# Patient Record
Sex: FEMALE | Race: WHITE | URBAN - METROPOLITAN AREA
[De-identification: names, ages, dates, MRNs, and addresses within clinical notes are randomized per-mention and may not be internally consistent; named-entity substitution may affect disease eponyms.]

---

## 2022-03-18 PROBLEM — R22.1 LUMP IN NECK: Status: ACTIVE | Noted: 2022-02-14

## 2022-03-19 PROBLEM — E04.1 THYROID NODULE: Status: ACTIVE | Noted: 2022-02-14

## 2023-02-21 ENCOUNTER — OFFICE VISIT (OUTPATIENT)
Dept: ENDOCRINOLOGY | Age: 48
End: 2023-02-21
Payer: COMMERCIAL

## 2023-02-21 VITALS — SYSTOLIC BLOOD PRESSURE: 102 MMHG | OXYGEN SATURATION: 99 % | DIASTOLIC BLOOD PRESSURE: 70 MMHG | HEART RATE: 73 BPM

## 2023-02-21 DIAGNOSIS — E04.1 THYROID NODULE: Primary | ICD-10-CM

## 2023-02-21 DIAGNOSIS — R22.1 LUMP IN NECK: ICD-10-CM

## 2023-02-21 PROCEDURE — 99214 OFFICE O/P EST MOD 30 MIN: CPT | Performed by: INTERNAL MEDICINE

## 2023-02-21 PROCEDURE — 76536 US EXAM OF HEAD AND NECK: CPT | Performed by: INTERNAL MEDICINE

## 2023-02-21 ASSESSMENT — ENCOUNTER SYMPTOMS
DIARRHEA: 0
CONSTIPATION: 1

## 2023-02-21 NOTE — PROGRESS NOTES
Jem Dee MD, [facility]  [address], [address] [facility], [address] [place] [address] Nikh[place]  Phone [phone]          Viet Holliday is a 50 y.o. female seen 2/21/2023 for follow-up of thyroid nodule      ASSESSMENT AND PLAN:    1. Thyroid nodule  Status post negative FNA biopsy of the inferior left lobe nodule 3/2021. Thyroid ultrasound performed today revealed that the nodule was stable in size and without suspicious sonographic features. I will have her return in 2 years for a follow-up ultrasound to document stability. 2. Lump in neck  She has been told that the right submandibular lump likely represents a lymph node or lipoma. She reports that the lump frequently enlarges and then gets smaller in size, which is not typical of a lipoma. I did not see any obvious mass on ultrasound today. Dr. Adithya Gill ordered a neck CT but she never had it performed. I recommended that she follow-up with Dr. Adithya Gill. She may ultimately benefit from just having the lump resected. Procedures:    Thyroid ultrasound 2/21/2023: Right lobe 1.64 x 1.09 x 4.40 cm, homogeneous echotexture. In the mid to inferior right lobe anteriorly there is a complex nodule measuring 0.28 cm (TR 2). In the inferior right lobe there is a probable cyst measuring 0.25 cm (TR 1). Isthmus measures 0.23 cm. Left lobe 1.49 x 1.41 x 4.62 cm. In the inferior left lobe there is a predominantly solid isoechoic nodule measuring 0.67 x 0.89 x 1.30 cm without microcalcifications (TR 3). Examination of the reported area of concern in the right submandibular region does not reveal any obvious masses or abnormal lymph nodes. Follow-up and Dispositions    Return 2 years. HISTORY OF PRESENT ILLNESS:    THYROID NODULE / MULTINODULAR GOITER     Presentation: She was diagnosed with a thyroid nodule in 10/2020 In [place].   She states that the nodule had doubled in size based on an ultrasound in 2/2021. Thyroid Cancer Risk Factors: There is no family history of thyroid cancer. There is no history of radiation to the head/neck. Symptoms: She has a history of a right-sided submandibular neck mass which began in 2016. She was evaluated by ENT and told that it was a lipoma. She states that the mass enlarges and shrinks periodically. The mass started growing again in 3 weeks ago. She has been evaluated by Dr. Leodan Werner (ENT) and he thinks it is likely a lymph node. Denies lower anterior neck enlargement/pain/pressure. She reports occasional dysphagia. She had a normal swallowing study. Denies positional shortness of breath. She reports hoarseness. Imaging:  Thyroid ultrasound 2/16/2021: In the inferior left lobe there is a solid nodule measuring 1.3 x 0.6 x 0.8 cm, hyperechoic or isoechoic, wider than tall, ill-defined margins, no echogenic foci (TR 3). FNA biopsy left lobe nodule 3/10/2021: Benign. Thyroid ultrasound 10/7/2021: Small subcentimeter nodule in the right lobe measuring 0.3 x 0.2 cm. In the mid left lobe there is a mixed echogenicity, predominantly solid nodule measuring 1.4 x 0.8 cm, no calcifications, slightly hyperechoic. Thyroid ultrasound 1/4/2022 [facility] Right lobe 4.6 x 1.4 x 1.4 cm, no nodules. Left lobe 4.1 x 1.3 x 1.6 cm. In the inferior left lobe there is a solid isoechoic nodule measuring 0.7 x 1.1 x 0.9 cm, wider than tall, ill-defined margins, no echogenic foci (TR 3). No enlarged lymph nodes identified. Along the palpable region of the right neck there is a oblong fat-containing lesion in the subcutaneous soft tissues measuring approximately 1.7 cm, favored to represent a mildly prominent fat lobule or lipoma. Thyroid ultrasound 2/14/2022: Right lobe 1.43 x 1.32 x 4.55 cm, homogeneous echotexture. In the mid to inferior right lobe anteriorly there is a complex nodule measuring 0.18 x 0.26 x 0.30 cm (TR 2).   In the inferior right lobe there is a probable cyst measuring 0.16 x 0.20 x 0.22 cm (TR 1). Isthmus measures 0.21 cm. Left lobe 1.43 x 1.58 x 4.01 cm. In the inferior left lobe there is a predominantly solid isoechoic nodule measuring 0.66 x 0.86 x 1.18 cm without microcalcifications (TR 3). Examination of the reported area of concern in the right submandibular region does not reveal any obvious masses or abnormal lymph nodes. Thyroid ultrasound 2/21/2023: Right lobe 1.64 x 1.09 x 4.40 cm, homogeneous echotexture. In the mid to inferior right lobe anteriorly there is a complex nodule measuring 0.28 cm (TR 2). In the inferior right lobe there is a probable cyst measuring 0.25 cm (TR 1). Isthmus measures 0.23 cm. Left lobe 1.49 x 1.41 x 4.62 cm. In the inferior left lobe there is a predominantly solid isoechoic nodule measuring 0.67 x 0.89 x 1.30 cm without microcalcifications (TR 3). Examination of the reported area of concern in the right submandibular region does not reveal any obvious masses or abnormal lymph nodes. Labs:  12/7/2021: TSH 1.835.  10/22/2022: TSH 2.55. Review of Systems   Constitutional:  Positive for fatigue. Negative for diaphoresis. Weight increased 10-15 pounds per her report (she refused to be weighed today). Cardiovascular:  Positive for palpitations (occasionally). Gastrointestinal:  Positive for constipation. Negative for diarrhea. Endocrine: Positive for cold intolerance and heat intolerance. Genitourinary:         Status post hysterectomy (ovaries intact). Neurological:  Negative for tremors. Psychiatric/Behavioral:  Positive for sleep disturbance. Vital Signs:  /70   Pulse 73   SpO2 99%     Physical Exam  Constitutional:       General: She is not in acute distress. Neck:      Thyroid: No thyroid mass or thyromegaly. Comments: 1.5 cm right submandibular, subcutaneous nodule, soft and freely moveable.   Cardiovascular:      Rate and Rhythm: Normal rate and regular rhythm. Lymphadenopathy:      Cervical: No cervical adenopathy. Neurological:      Motor: No tremor. Orders Placed This Encounter   Procedures    CHG US SOFT TISSUE HEAD & NECK REAL TIME IMGE DOCM         Current Outpatient Medications   Medication Sig Dispense Refill    fluticasone (FLONASE) 50 MCG/ACT nasal spray 1 spray by Nasal route daily      levocetirizine (XYZAL) 5 MG tablet Take 5 mg by mouth       No current facility-administered medications for this visit.